# Patient Record
Sex: MALE | Race: WHITE
[De-identification: names, ages, dates, MRNs, and addresses within clinical notes are randomized per-mention and may not be internally consistent; named-entity substitution may affect disease eponyms.]

---

## 2018-08-07 ENCOUNTER — HOSPITAL ENCOUNTER (OUTPATIENT)
Dept: HOSPITAL 75 - RAD | Age: 63
End: 2018-08-07
Attending: NURSE PRACTITIONER
Payer: COMMERCIAL

## 2018-08-07 DIAGNOSIS — E66.3: ICD-10-CM

## 2018-08-07 DIAGNOSIS — R60.0: ICD-10-CM

## 2018-08-07 DIAGNOSIS — L95.8: ICD-10-CM

## 2018-08-07 DIAGNOSIS — L03.119: Primary | ICD-10-CM

## 2018-08-07 DIAGNOSIS — I10: ICD-10-CM

## 2018-08-07 DIAGNOSIS — R03.0: ICD-10-CM

## 2018-08-07 DIAGNOSIS — E11.8: ICD-10-CM

## 2018-08-07 PROCEDURE — 93926 LOWER EXTREMITY STUDY: CPT

## 2018-08-07 NOTE — DIAGNOSTIC IMAGING REPORT
INDICATION: Cellulitis of the left leg.



TECHNIQUE: Grayscale, color-flow and duplex Doppler evaluation of

the left lower extremity arterial system was performed.



FINDINGS: Predominately triphasic waveforms throughout the left

lower extremity arterial system are noted. Velocities are

unremarkable. No velocity elevation is seen to suggest stenosis.

There is no occlusion. There is monophasic flow in the dorsalis

pedis.



IMPRESSION: Essentially unremarkable left lower extremity

arterial Doppler.



Dictated by: 



  Dictated on workstation # [id number]

## 2018-08-07 NOTE — DIAGNOSTIC IMAGING REPORT
PROCEDURE: US left lower extremity venous.



TECHNIQUE: Multiple real-time grayscale images were obtained over

the left lower extremity in various projections. Additional

duplex Doppler and color Doppler images were also obtained.



INDICATION: Left leg cellulitis and pain.



FINDINGS: There is no evidence of a left lower extremity DVT.

Left lower extremity deep venous system shows normal

compressibility with normal response to augmentation and

Valsalva. No fluid collection or mass is seen.



IMPRESSION: No evidence of left lower extremity DVT.



Dictated by: 



  Dictated on workstation # [id number]